# Patient Record
Sex: MALE
[De-identification: names, ages, dates, MRNs, and addresses within clinical notes are randomized per-mention and may not be internally consistent; named-entity substitution may affect disease eponyms.]

---

## 2019-01-12 ENCOUNTER — HOSPITAL ENCOUNTER (EMERGENCY)
Dept: HOSPITAL 88 - ER | Age: 24
Discharge: HOME | End: 2019-01-12
Payer: COMMERCIAL

## 2019-01-12 VITALS — BODY MASS INDEX: 33.86 KG/M2 | WEIGHT: 250 LBS | HEIGHT: 72 IN
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
## 2019-01-12 DIAGNOSIS — S20.219A: Primary | ICD-10-CM

## 2019-01-12 DIAGNOSIS — E66.9: ICD-10-CM

## 2019-01-12 DIAGNOSIS — L30.9: ICD-10-CM

## 2019-01-12 PROCEDURE — 71046 X-RAY EXAM CHEST 2 VIEWS: CPT

## 2019-01-12 PROCEDURE — 99283 EMERGENCY DEPT VISIT LOW MDM: CPT

## 2019-01-12 NOTE — XMS REPORT
Encounter Summary

                             Created on: 2018



Dominic Ch

External Reference #: [id number]

: 1995

Sex: Male



Demographics







                          Address                   [address] [place]  [address]

 

                          Home Phone                [phone]

 

                          Preferred Language        Unknown

 

                          Marital Status            Never 

 

                          Restorationism Affiliation     Unknown

 

                          Race                      Unknown

 

                          Ethnic Group              Unknown





Author







                          Organization              Unknown

 

                          Address                   [address] [place] [address]  [address]



 

                          Phone                     [phone]







Care Team Providers







                    Care Team Member Name    Role                Phone

 

                    Yadira Lopes      3                   [phone]



                                                      



Reason for Visit

                      





                                        Medical Complaint                



                                                                              



Instructions

          





                                        1. Viral gastroenteritis

 

                                         ondansetron HCl 8 mg tablet

 

                                        2. Influenza-like symptoms

 

                                         rapid flu (A+B)

 

                                        3. Headache

 

                                         headache: care instructions

 

                                         EC-Naprosyn 500 mg tablet,delayed release

 

                                        4. Body mass index 30+ - obesity









                                        Discussion Note

 

                                        Pt is in NAD; Verbalizes understanding of all instructions with no questions at 

this time.



                                                                                
       



Plan of Care

          





Patient Instructions





                                        Stay hydrated with pedialyte or gatorade, eat a liquid diet for the first four hours.

 Stay away from fried and spicy foods until symptoms resolve. If you do 
experience improvement in your symptoms within the next four hours, advance 
yourself to a carbohydrate-rich diet such as white rice, white bread, and 
crackers. Within the next four hours thereafter, you can advance to lean meats 
such as chicken, fish, and turkey. Four hours thereafter if symptoms do improve,
then advance to a regular diet. Take over the counter imodium for diarrhea as 
per package insert. Take ondansetron for nausea and vomiting as per package 
insert for headache. Follow up with your PCP within 2-3 should symptoms worsen 
as discussed. In case of an emergency call 911 or go to nearest ER. Recommend 
follow a low sodium/fat/carb diet and exercise 30-45 mins/d 3-4 days a week once
symptoms resolve.











                Reminders                                       Provider

 

                Appointments    None recorded.                   

 

                Lab             Rapid Flu (A+B)    2018      [facility]

 

                Referral        None recorded.                   

 

                Procedures      None recorded.                   

 

                Surgeries       None recorded.                   

 

                Imaging         None recorded.                   



                                                                                
       



Medications

                      





                    Name                      Start Date                                      

 

                                        EC-Naprosyn 500 mg tablet,delayed release

 Take 1 tablet twice a day by oral route as needed for 7 days.    

 

                                        ondansetron HCl 8 mg tablet

 Take 1 tablet every 8 hours by oral route as needed for 2 days.    



                                                                                
                 



Medications Administered

          



  None recorded.                                                                
               



Vitals

          





                Height          Weight          BMI             Blood Pressure 

 

                 6 ft 1 in        250 lbs         33 kg/m2        118/72 mm[Hg]  



                                                                              



Lab Results

                      





                Date                      Name                      Specimen                      Result

                          Interpretation                      Description                

                Value                      Range                      Status                      Address

                                                        

 

             Rapid Flu (A+B)                         Influenza a     negative                [facility]: [facility]

 

                                        Influenza B     negative                [facility]: [facility]





                                                                                
        



Allergies

          





          Code      Code System    Name      Reaction    Severity    Status    Onset

 

             NKDA                                             



                                                                              



Problems

                      





                Name                      Status                      Onset Date                      

Source                                                  

 

                Viral Gastroenteritis    Active          2018      

 

                Influenza-like Symptoms    Active          2018      

 

                Headache        Active          2018      



                                                                                
                            



Procedures

          



None recorded.                                                                  
  



Vaccine List

          





                                        Vaccine Type

 

                                        meningococcal MCV4P

 

                                        2014



                                                                              



Social History

          





                    Smoking Status      Never Smoker         



                                                                              



Past Encounters

                      





                                        2018

Viral Gastroenteritis; Influenza-like Symptoms; Headache; Body Mass Index 30+ - 
Obesity

Elidia Richards, [facility]: [address] [place] [address], Ph. [phone]



                                                                                
        



History of Present Illness

          





                                                   Nausea-Vomiting-Diarrhea / Abdominal Pain

 

                          Reported By:              Patient

 

                          HPI:                      Quality: watery, intermittent. Severity: moderate. Duration: present for <

 1 week, symptoms last for how long?. Onset/Timing: worse with meals, gradual 
onset, 4-10 times a day. Context: no one else with similar symptoms, no recent 
camping, no recent picnic, no possible food sources, no recent travel. 
Alleviating factors: better with fasting. Aggravating factors: eating. 
Associated Symptoms: no abdominal pain, no excess gas, no fever/chills, no rash,
 no joint pain, no weight loss, no vomiting, no heartburn, no blood in stool, no
 mucus in stool, no black or tarry stools, no weakness, no nutrient deficiency, 
no feeling of fullness/mass in throat, no muscle aches, no bitter taste in the 
mouth, no difficulty swallowing (dysphagia), nausea, headache; body aches and 
watery diarrhea









                                                   Headache

 

                          Reported By:              Patient

 

                          HPI:                      Location: frontal. Quality: not the worst headache ever, similar to previous

 headaches. Severity: moderate, pain level 6/10. Duration: intermittent. 
Onset/Timing: gradual, still present. Context: not related to trauma. Modifying 
factors: rest; Has not taken any meds. Associated Symptoms: no fever/chills, no 
muscle aches, no vomiting, no sensitivity to light, tearing/watery eyes, no 
confusion, no slurred speech, no preceeding aura, no double vision, normal 
feeling/sensation, no motor paralysis, no dizziness, no sleep disturbances, no 
nosebleeds, no hoarseness, no sore throat, no hearing loss, headache, nausea; 
body aches and watery diarrhea







Review of Systems:ROS as noted in the HPI                                       
                             



Review of Systems

                      





                                                   Basic

 

                          Reported By:              Patient



                                                                              



Physical Exam

                      





                                                   Adult Basic, 14-21 Yr Male, Adult Male Complete

 

                          Reported By:              Patient

 

                          Constitutional:           General Appearance: obese. Level of Distress: NAD. Ambulation: 

ambulating normally

 

                          Psychiatric:              Mental Status: active and alert. Orientation: to time, to place, to

 person

 

                          Eyes:                     Lids and Conjunctivae: non-injected, no discharge, no pallor. Pupils: PERRLA,

 equal size, round, reactive to light. Corneas: grossly intact. EOM: EOMI, 
normal cover/uncover test. Lens: clear. Vision: peripheral vision grossly intact

 

                          Ear-Nose-Mouth-Throat:    Ears: no lesions on external ear, no outer ear tenderness,

 EACs clear, TMs clear, TM mobility normal. Nose: no lesions on external nose, 
nares patent, no septal deviation, nasal passages clear, no sinus tenderness, no
 nasal discharge. Lips, Teeth, and Gums: no mouth or lip ulcers, no bleeding 
gums, normal dentition. Oropharynx: moist mucous membranes, no erythema, no 
exudates, tonsils not enlarged

 

                          Neck:                     Neck: supple. Lymph Nodes: no cervical LAD

 

                          Lungs:                    Respiratory effort: no dyspnea, no tachypnea, no use of accessory muscles,

 no intercostal retractions. Auscultation: breath sounds normal, good air 
movement

 

                          Cardiovascular:           Heart Auscultation: RRR, no murmurs

 

                          Neurologic:               Gait and Station: normal gait, normal station. Cranial Nerves: grossly

 intact. Sensation: grossly intact. Reflexes: deep tendon reflexes 2+ 
bilaterally throughout

 

                          Abdomen:                  Palpation: non-distended, no guarding, no tenderness. Liver: non-tender,

 no hepatomegaly. Spleen: non-tender, no splenomegaly. Hernia: no palpable 
hernias. Bowel Sounds: high-pitched. Inspection and Palpation: soft, no rebound 
tenderness, no masses, no CVA tenderness

## 2019-01-12 NOTE — XMS REPORT
Continuity of Care Document

                             Created on: 2018



SHUKRI HEARN

External Reference #: [id number]

: 1995

Sex: Male



Demographics







                          Address                   [address] [place]  [address]

 

                          Home Phone                [phone]

 

                          Preferred Language        Unknown

 

                          Marital Status            Unknown

 

                          Mu-ism Affiliation     Unknown

 

                          Race                      Unknown

 

                          Ethnic Group              Unknown





Author







                          Author                    [facility]              Interface

 

                          Address                   Unknown

 

                          Phone                     Unavailable



                                                    



Problems

                    





                    Problem                            Status                            Onset Date     

                          Classification                            Date Reported       

                          Comments                            Source                    

 

                    Viral Gastroenteritis                                                        2018

                            Problem                            2018            

                                                      [facility]                    

 

                    Influenza-like Symptoms                                                        2018

                            Problem                            2018            

                                                      [facility]                    

 

                    Headache                                                        2018          

                          Problem                            2018                      

                                                      [facility]                    

 

                    Viral gastroenteritis                                                        2018

                            Diagnosis                            2018          

                                                      [facility]                    

 

                    Influenza-like symptoms                                                        2018

                            Diagnosis                            2018          

                                                      [facility]                    

 

                          Body mass index 30+ - obesity                                                     

                    2018                            Diagnosis                            2018

                                                        [facility]                   

 

 

                          Viral upper respiratory tract infection                                           

                          2018                            Diagnosis                         

                    2018                                                        [facility]        

            

 

                    Throat symptom                                                        2018    

                          Diagnosis                            2018              

                                                      [facility]                    

 

                    Acute Sinusitis                                                                     

                          Problem                            2018                         

                                                      [facility]                    

 

                    Allergic Rhinitis                                                                   

                          Problem                            2018                       

                                                      [facility]                    

 

                    Gastroenteritis                                                                     

                          Problem                            2018                         

                                                      [facility]                    



                                                                                
                                                                                
                                                                                
     



Medications

                    





                    Medication                            Details                            Route      

                          Status                            Patient Instructions         

                          Ordering Provider                            Order Date           

                                        Source                    

 

                          Naproxen 500 MG Delayed Release Oral Tablet [Naprosyn]                            

EC-Naprosyn 500 mg tablet,delayed release Take 1 tablet twice a day by oral 
route as needed for 7 days.                                                        Active

                                                                                       

                                        [facility]                    

 

                          Ondansetron 8 MG Oral Tablet                            ondansetron HCl 8 mg tablet

 Take 1 tablet every 8 hours by oral route as needed for 2 days.                
                                                Active                                         

                                                                            [facility]      

              

 

                                        Brompheniramine Maleate 0.4 MG/ML / Dextromethorphan Hydrobromide 2 MG/ML / Pseudoephedrine

 Hydrochloride 6 MG/ML Oral Solution [Bromfed DM]                            Bromfed

 DM 2 mg-30 mg-10 mg/5 mL syrup Take 10 mL every 4-6 hours by oral route as 
needed for 6 days.                                                        Active     

                                                                                       

                                        [facility]                    



                                                                                
                                       



Allergies, Adverse Reactions, Alerts

                    





                    Substance                            Category                            Reaction   

                          Severity                            Reaction type           

                          Status                            Date Reported                     

                          Comments                            Source                    



                                                                



Immunizations

                    





                    Immunization                            Date Given                            Site  

                          Status                            Last Updated             

                          Comments                            Source                    

 

                          meningococcal MCV4P                            2014                         

                                                completed                                               

                                                      [facility]                    



                                                                                
       



Results

                    





                    Order Name                            Results                            Value      

                          Reference Range                            Date                

                          Interpretation                            Comments                       

                                        Source                    

 

                                                Influenza A                            negative         

                                                 2018                            

                                                        [facility]                   

 

 

                                                Influenza B                            negative         

                                                 2018                            

                                                        [facility]                   

 

 

                                                RESULT                            negative              

                                                2018                                 

                                                      [facility]                    

 

                                                SWAB LOCATION                            Left and Right 

tonsillar pillars                                                          2018

                                                                                    [facility]

                    

 

                                                Influenza A                            negative         

                                                 2018                            

                                                        [facility]                   

 

 

                                                Influenza B                            negative         

                                                 2018                            

                                                        [facility]                   

 



                                                                                
                                                                                
              



Vital Signs

                    





                    Vital Sign                            Value                            Date         

                          Comments                            Source                    

 

                    Diastolic (mm Hg)                            72                             2018

                                                        [facility]                   

 

 

                    Height                            73                             2018         

                                                      [facility]                    

 

                    Systolic (mm Hg)                            118                             2018

                                                        [facility]                   

 

 

                    Weight                            250                             2018        

                                                      [facility]                    

 

                    Diastolic (mm Hg)                            72                             2018

                                                        [facility]                   

 

 

                    Height                            73                             2018         

                                                      [facility]                    

 

                    Systolic (mm Hg)                            114                             2018

                                                        [facility]                   

 

 

                    Weight                            240                             2018        

                                                      [facility]                    



                                                                                
                                                                                
                                      



Encounters

                    





                    Location                            Location Details                            Encounter

 Type                            Encounter Number                            Reason For

 Visit                            Attending Provider                            ADM Date

                            DC Date                            Status                

                                        Source                    

 

                          [place] - [facility] - [id number]: [address] [place] [address], Ph. [phone]

 [phone]                               [id number]          

                                                      Elidia Richards                          

                    2018                                                                          

                                        [facility]                    

 

                          [place] - [id number]: [address] [place] [address], Ph. [phone]                               [id number]          

                                                      Elidia Richards                          

                    2018                                                                          

                                        [facility]                    



                                                                                
               



Procedures

                    





                    Procedure                            Code                            Date           

                          Perfomer                            Comments                        

                                        Source

## 2019-01-12 NOTE — XMS REPORT
Encounter Summary

                             Created on: 2018



Dominic Ch

External Reference #: [id number]

: 1995

Sex: Male



Demographics







                          Address                   [address] [place]  [address]

 

                          Home Phone                [phone]

 

                          Preferred Language        Unknown

 

                          Marital Status            Never 

 

                          Confucianism Affiliation     Unknown

 

                          Race                      Unknown

 

                          Ethnic Group              Unknown





Author







                          Organization              Unknown

 

                          Address                   [address] [place] [address]  [address]



 

                          Phone                     [phone]







Care Team Providers







                    Care Team Member Name    Role                Phone

 

                    Yadira Lopes      [phone]                   [phone]



                                                      



Reason for Visit

                      





                                        Medical Complaint                



                                                                              



Instructions

          





                                        1. Viral upper respiratory tract infection

 

                                         Bromfed DM 2 mg-30 mg-10 mg/5 mL syrup

 

                                         rapid flu (A+B)

 

                                        2. Throat symptom

 

                                         rapid strep group A, throat









                                        Discussion Note

 

                                        Pt is in NAD; Verbalizes understanding of all instructions with no questions at 

this time.







Patient educational handouts: No information available.                         
                                                    



Plan of Care

          





Patient Instructions





                                        Take Bromfed DM for cough as directed. Take fluticasone as needed for nasal and 

ear congestion. Spray one spray in each nostril twice a day. Take a warm, steamy
shower, blow your nose thereafter, and spray in each nostril. Tilt your head up 
for about 10 seconds and breath through your mouth. Do not sniff or snort the 
medication in or else the medication will go to your throat and not be absorbed 
appropriately. Alternate with Ibuprofen and acetaminophen every 4hrs as needed 
for pain/fever/headache. Proper hydration and rest. Return to work/school if 
free of fever for 24-hrs. Do not share any utensils/cups, no kissing, recommend 
hand washing after coughing/sneezing/blowing nose and cover face when you do so.
Take medications as prescribed. Return to clinic or follow up with your PCP 
within 2-3 days if symptoms worsen as discussed. 

 













                Reminders                                       Provider

 

                Appointments    None recorded.                   

 

                Lab             Rapid Flu (A+B)    2018      [facility]

 

                               Rapid Strep Group a, Throat    2018      [facility]

 

                Referral        None recorded.                   

 

                Procedures      None recorded.                   

 

                Surgeries       None recorded.                   

 

                Imaging         None recorded.                   



                                                                                
                 



Medications

                      





                    Name                      Start Date                                      

 

                                        Bromfed DM 2 mg-30 mg-10 mg/5 mL syrup

 Take 10 mL every 4-6 hours by oral route as needed for 6 days.    



                                                                                
       



Medications Administered

          



  None recorded.                                                                
               



Vitals

          





                Height          Weight          BMI             Blood Pressure 

 

                 6 ft 1 in        240 lbs         31.7 kg/m2        114/72 mm[Hg]  



                                                                              



Lab Results

                      





                Date                      Name                      Specimen                      Result

                          Interpretation                      Description                

                Value                      Range                      Status                      Address

                                                        

 

             Rapid Strep Group a, Throat                         Result     negative                [facility]: 

[facility]

 

                                        Swab Location     Left and Right tonsillar pillars                [facility]:

 [facility]

 

             Rapid Flu (A+B)                         Influenza a     negative                [facility]: [facility]

 

                                        Influenza B     negative                [facility]: [facility]





                                                                                
                  



Allergies

          





          Code      Code System    Name      Reaction    Severity    Status    Onset

 

             NKDA                                             



                                                                              



Problems

                      





                Name                      Status                      Onset Date                      

Source                                                  

 

                Acute Sinusitis    Active                         Encounter

 

                Allergic Rhinitis    Active                         Encounter

 

                Gastroenteritis    Active                         Encounter



                                                                                
                            



Procedures

          



None recorded.                                                                  
  



Vaccine List

          





                                        Vaccine Type

 

                                        meningococcal MCV4P

 

                                        2014



                                                                              



Social History

          





                    Smoking Status      Never Smoker         



                                                                              



Past Encounters

                      





                                        2018

Viral Upper Respiratory Tract Infection; Throat Symptom

Elidia Richards [facility]: [address] [place] [address], Ph. [phone]



                                                                                
        



History of Present Illness

          





                                                   Illness-Fever-Flu

 

                          Reported By:              Patient

 

                          HPI:                      Quality: symptoms worse during the day. Duration: 4 days. Severity: subjective

 temperature. Context: no ill contacts, no tick/insect bites, no recent travel, 
no new medications. Associated Symptoms: no fever/chills, no headache, no muscle
 aches, no rash, no lethargy, cough, nasal passage blockage (stuffiness), nasal 
discharge; scratchy throat. Modifying Factors nothing gives relief







Review of Systems:ROS as noted in the HPI                                       
                             



Review of Systems

                      





                                                   Basic

 

                          Reported By:              Patient



                                                                              



Physical Exam

                      





                                                   Adult Basic, 14-21 Yr Male, Adult Female Complete, Adult Male Complete

 

                          Reported By:              Patient

 

                          Constitutional:           General Appearance: healthy-appearing, well-nourished, well-developed.

 Level of Distress: NAD. Ambulation: ambulating normally

 

                          Psychiatric:              Mental Status: active and alert. Orientation: to time, to place, to

 person

 

                          Ear-Nose-Mouth-Throat:    Ears: no lesions on external ear, no outer ear tenderness,

 EACs clear, TMs clear. Hearing: no hearing loss. Nose: no lesions on external 
nose, nares patent, no septal deviation, nasal passages clear, no sinus 
tenderness, nasal discharge--rhinorrhea, post nasal drip. Lips, Teeth, and Gums:
 no mouth or lip ulcers, no bleeding gums, normal dentition. Oropharynx: moist 
mucous membranes, no erythema, no exudates, tonsils not enlarged

 

                          Neck:                     Lymph Nodes: no cervical LAD

 

                          Lungs:                    Respiratory effort: no dyspnea, no tachypnea, no use of accessory muscles,

 no intercostal retractions. Auscultation: breath sounds normal

 

                          Cardiovascular:           Heart Auscultation: RRR, no murmurs

 

                          Neurologic:               Gait and Station: normal gait, normal station

## 2024-10-06 ENCOUNTER — HOSPITAL ENCOUNTER (INPATIENT)
Dept: HOSPITAL 88 - ER | Age: 29
LOS: 3 days | Discharge: HOME | DRG: 386 | End: 2024-10-09
Attending: INTERNAL MEDICINE | Admitting: INTERNAL MEDICINE
Payer: COMMERCIAL

## 2024-10-06 VITALS
HEART RATE: 80 BPM | RESPIRATION RATE: 18 BRPM | DIASTOLIC BLOOD PRESSURE: 67 MMHG | SYSTOLIC BLOOD PRESSURE: 129 MMHG | TEMPERATURE: 98.1 F | OXYGEN SATURATION: 98 %

## 2024-10-06 VITALS — WEIGHT: 235 LBS | HEIGHT: 72 IN | BODY MASS INDEX: 31.83 KG/M2

## 2024-10-06 VITALS
TEMPERATURE: 97.7 F | DIASTOLIC BLOOD PRESSURE: 79 MMHG | SYSTOLIC BLOOD PRESSURE: 130 MMHG | RESPIRATION RATE: 19 BRPM | HEART RATE: 103 BPM | OXYGEN SATURATION: 98 %

## 2024-10-06 VITALS
DIASTOLIC BLOOD PRESSURE: 71 MMHG | RESPIRATION RATE: 18 BRPM | HEART RATE: 87 BPM | SYSTOLIC BLOOD PRESSURE: 126 MMHG | TEMPERATURE: 98.3 F | OXYGEN SATURATION: 98 %

## 2024-10-06 VITALS
OXYGEN SATURATION: 98 % | SYSTOLIC BLOOD PRESSURE: 134 MMHG | HEART RATE: 86 BPM | DIASTOLIC BLOOD PRESSURE: 74 MMHG | TEMPERATURE: 98.2 F | RESPIRATION RATE: 19 BRPM

## 2024-10-06 VITALS
TEMPERATURE: 97.7 F | OXYGEN SATURATION: 98 % | HEART RATE: 103 BPM | DIASTOLIC BLOOD PRESSURE: 79 MMHG | SYSTOLIC BLOOD PRESSURE: 130 MMHG | RESPIRATION RATE: 19 BRPM

## 2024-10-06 VITALS
HEART RATE: 103 BPM | RESPIRATION RATE: 19 BRPM | OXYGEN SATURATION: 98 % | TEMPERATURE: 97.7 F | SYSTOLIC BLOOD PRESSURE: 130 MMHG | DIASTOLIC BLOOD PRESSURE: 79 MMHG

## 2024-10-06 VITALS
DIASTOLIC BLOOD PRESSURE: 71 MMHG | TEMPERATURE: 98.3 F | RESPIRATION RATE: 18 BRPM | HEART RATE: 87 BPM | SYSTOLIC BLOOD PRESSURE: 126 MMHG | OXYGEN SATURATION: 98 %

## 2024-10-06 VITALS — TEMPERATURE: 98.4 F | HEART RATE: 88 BPM

## 2024-10-06 DIAGNOSIS — K76.0: ICD-10-CM

## 2024-10-06 DIAGNOSIS — R53.81: ICD-10-CM

## 2024-10-06 DIAGNOSIS — R00.0: ICD-10-CM

## 2024-10-06 DIAGNOSIS — R73.9: ICD-10-CM

## 2024-10-06 DIAGNOSIS — K52.9: ICD-10-CM

## 2024-10-06 DIAGNOSIS — N39.0: ICD-10-CM

## 2024-10-06 DIAGNOSIS — E66.9: ICD-10-CM

## 2024-10-06 DIAGNOSIS — R11.2: ICD-10-CM

## 2024-10-06 DIAGNOSIS — E83.41: ICD-10-CM

## 2024-10-06 DIAGNOSIS — R42: ICD-10-CM

## 2024-10-06 DIAGNOSIS — R25.2: ICD-10-CM

## 2024-10-06 DIAGNOSIS — R13.10: ICD-10-CM

## 2024-10-06 DIAGNOSIS — K50.918: Primary | ICD-10-CM

## 2024-10-06 DIAGNOSIS — E86.0: ICD-10-CM

## 2024-10-06 DIAGNOSIS — F41.9: ICD-10-CM

## 2024-10-06 DIAGNOSIS — E87.6: ICD-10-CM

## 2024-10-06 DIAGNOSIS — B96.20: ICD-10-CM

## 2024-10-06 DIAGNOSIS — L30.9: ICD-10-CM

## 2024-10-06 DIAGNOSIS — K62.89: ICD-10-CM

## 2024-10-06 DIAGNOSIS — Z83.3: ICD-10-CM

## 2024-10-06 LAB
ALBUMIN SERPL-MCNC: 4.6 G/DL (ref 3.5–5)
ALBUMIN/GLOB SERPL: 1.2 {RATIO} (ref 0.8–2)
ALP SERPL-CCNC: 77 IU/L (ref 40–150)
ALT SERPL-CCNC: 26 IU/L (ref 0–55)
ANION GAP SERPL CALC-SCNC: 23.3 MMOL/L (ref 8–16)
BACTERIA URNS QL MICRO: (no result) /HPF
BASOPHILS # BLD AUTO: 0 10*3/UL (ref 0–0.1)
BASOPHILS NFR BLD AUTO: 0.3 % (ref 0–1)
BILIRUB SERPL-MCNC: 0.7 MG/DL (ref 0.2–1.2)
BILIRUB UR QL: NEGATIVE
BUN SERPL-MCNC: 17 MG/DL (ref 7–26)
BUN/CREAT SERPL: 17 (ref 6–25)
CALCIUM SERPL-MCNC: 9.8 MG/DL (ref 8.4–10.2)
CHLORIDE SERPL-SCNC: 105 MMOL/L (ref 98–107)
CK SERPL-CCNC: 101 IU/L (ref 30–200)
CLARITY UR: CLEAR
CO2 SERPL-SCNC: 14 MMOL/L (ref 22–29)
COLOR UR: YELLOW
DEPRECATED APTT PLAS QN: 28.2 SECONDS (ref 23.8–35.5)
DEPRECATED INR PLAS: 0.94
DEPRECATED NEUTROPHILS # BLD AUTO: 11.1 10*3/UL (ref 2.1–6.9)
DEPRECATED RBC URNS MANUAL-ACNC: (no result) /HPF (ref 0–5)
EGFRCR SERPLBLD CKD-EPI 2021: 108 ML/MIN (ref 60–?)
EOSINOPHIL # BLD AUTO: 0.1 10*3/UL (ref 0–0.4)
EOSINOPHIL NFR BLD AUTO: 0.3 % (ref 0–6)
EPI CELLS URNS QL MICRO: (no result) /LPF
ERYTHROCYTE [DISTWIDTH] IN CORD BLOOD: 12.4 % (ref 11.7–14.4)
GLOBULIN PLAS-MCNC: 3.7 G/DL (ref 2.3–3.5)
GLUCOSE SERPLBLD-MCNC: 173 MG/DL (ref 74–118)
GLUCOSE UR QL STRIP.AUTO: NEGATIVE
HCT VFR BLD AUTO: 49.6 % (ref 38.2–49.6)
HGB BLD-MCNC: 17 G/DL (ref 14–18)
KETONES UR QL STRIP.AUTO: (no result)
LEUKOCYTE ESTERASE UR QL STRIP.AUTO: NEGATIVE
LIPASE SERPL-CCNC: 56 U/L (ref 8–78)
LYMPHOCYTES # BLD: 3 10*3/UL (ref 1–3.2)
LYMPHOCYTES NFR BLD AUTO: 20.3 % (ref 18–39.1)
MAGNESIUM SERPL-MCNC: 2.2 MG/DL (ref 1.3–2.1)
MCH RBC QN AUTO: 30.5 PG (ref 28–32)
MCHC RBC AUTO-ENTMCNC: 34.3 G/DL (ref 31–35)
MCV RBC AUTO: 89 FL (ref 81–99)
MONOCYTES # BLD AUTO: 0.5 10*3/UL (ref 0.2–0.8)
MONOCYTES NFR BLD AUTO: 3.4 % (ref 4.4–11.3)
NEUTS SEG NFR BLD AUTO: 75.4 % (ref 38.7–80)
NITRITE UR QL STRIP.AUTO: NEGATIVE
PH UR STRIP.AUTO: 5.5 [PH] (ref 5–7)
PLATELET # BLD AUTO: 273 X10E3/UL (ref 140–360)
POTASSIUM SERPL-SCNC: 3.3 MMOL/L (ref 3.5–5.1)
PROT SERPL-MCNC: 8.3 G/DL (ref 6.5–8.1)
PROT UR QL STRIP.AUTO: NEGATIVE
PROTHROMBIN TIME: 13 SECONDS (ref 11.9–14.5)
RBC # BLD AUTO: 5.57 X10E6/UL (ref 4.3–5.7)
SODIUM SERPL-SCNC: 139 MMOL/L (ref 136–145)
SP GR UR STRIP: 1 (ref 1.01–1.02)
TROPONIN I SERPL DL<=0.01 NG/ML-MCNC: < 0.001 NG/ML (ref 0–0.3)
UROBILINOGEN UR STRIP-MCNC: 0.2 MG/DL (ref 0.2–1)
WBC # BLD: 14.73 X10E3/UL (ref 4.8–10.8)
WBC #/AREA URNS HPF: (no result) /HPF (ref 0–5)

## 2024-10-06 PROCEDURE — 80061 LIPID PANEL: CPT

## 2024-10-06 PROCEDURE — 85610 PROTHROMBIN TIME: CPT

## 2024-10-06 PROCEDURE — 87186 SC STD MICRODIL/AGAR DIL: CPT

## 2024-10-06 PROCEDURE — 84100 ASSAY OF PHOSPHORUS: CPT

## 2024-10-06 PROCEDURE — 94799 UNLISTED PULMONARY SVC/PX: CPT

## 2024-10-06 PROCEDURE — 80048 BASIC METABOLIC PNL TOTAL CA: CPT

## 2024-10-06 PROCEDURE — 86140 C-REACTIVE PROTEIN: CPT

## 2024-10-06 PROCEDURE — 87086 URINE CULTURE/COLONY COUNT: CPT

## 2024-10-06 PROCEDURE — 80053 COMPREHEN METABOLIC PANEL: CPT

## 2024-10-06 PROCEDURE — 83690 ASSAY OF LIPASE: CPT

## 2024-10-06 PROCEDURE — 84439 ASSAY OF FREE THYROXINE: CPT

## 2024-10-06 PROCEDURE — 36415 COLL VENOUS BLD VENIPUNCTURE: CPT

## 2024-10-06 PROCEDURE — 84484 ASSAY OF TROPONIN QUANT: CPT

## 2024-10-06 PROCEDURE — 83036 HEMOGLOBIN GLYCOSYLATED A1C: CPT

## 2024-10-06 PROCEDURE — 99284 EMERGENCY DEPT VISIT MOD MDM: CPT

## 2024-10-06 PROCEDURE — 84443 ASSAY THYROID STIM HORMONE: CPT

## 2024-10-06 PROCEDURE — 93005 ELECTROCARDIOGRAM TRACING: CPT

## 2024-10-06 PROCEDURE — 85025 COMPLETE CBC W/AUTO DIFF WBC: CPT

## 2024-10-06 PROCEDURE — 74177 CT ABD & PELVIS W/CONTRAST: CPT

## 2024-10-06 PROCEDURE — 76705 ECHO EXAM OF ABDOMEN: CPT

## 2024-10-06 PROCEDURE — 81001 URINALYSIS AUTO W/SCOPE: CPT

## 2024-10-06 PROCEDURE — 83735 ASSAY OF MAGNESIUM: CPT

## 2024-10-06 PROCEDURE — 85730 THROMBOPLASTIN TIME PARTIAL: CPT

## 2024-10-06 PROCEDURE — 71045 X-RAY EXAM CHEST 1 VIEW: CPT

## 2024-10-06 PROCEDURE — 82550 ASSAY OF CK (CPK): CPT

## 2024-10-06 RX ADMIN — LORAZEPAM ONE MG: 2 INJECTION INTRAMUSCULAR; INTRAVENOUS at 11:30

## 2024-10-06 RX ADMIN — DICYCLOMINE HYDROCHLORIDE ONE MG: 10 INJECTION INTRAMUSCULAR at 11:30

## 2024-10-06 RX ADMIN — SODIUM CHLORIDE STA MLS/HR: 9 INJECTION, SOLUTION INTRAVENOUS at 11:29

## 2024-10-06 RX ADMIN — SODIUM CHLORIDE STA MG: 900 INJECTION INTRAVENOUS at 11:30

## 2024-10-06 RX ADMIN — SODIUM CHLORIDE SCH MLS/HR: 9 INJECTION, SOLUTION INTRAVENOUS at 14:48

## 2024-10-06 RX ADMIN — SODIUM CHLORIDE PRN MG: 900 INJECTION INTRAVENOUS at 18:09

## 2024-10-06 RX ADMIN — METHYLPREDNISOLONE SODIUM SUCCINATE STA MG: 125 INJECTION, POWDER, FOR SOLUTION INTRAMUSCULAR; INTRAVENOUS at 13:19

## 2024-10-06 RX ADMIN — Medication PRN MG: at 18:09

## 2024-10-07 VITALS
DIASTOLIC BLOOD PRESSURE: 93 MMHG | RESPIRATION RATE: 20 BRPM | SYSTOLIC BLOOD PRESSURE: 125 MMHG | HEART RATE: 73 BPM | TEMPERATURE: 98.8 F | OXYGEN SATURATION: 99 %

## 2024-10-07 VITALS
HEART RATE: 65 BPM | DIASTOLIC BLOOD PRESSURE: 56 MMHG | SYSTOLIC BLOOD PRESSURE: 129 MMHG | TEMPERATURE: 98.2 F | RESPIRATION RATE: 18 BRPM | OXYGEN SATURATION: 100 %

## 2024-10-07 VITALS
DIASTOLIC BLOOD PRESSURE: 67 MMHG | TEMPERATURE: 98.1 F | HEART RATE: 80 BPM | OXYGEN SATURATION: 98 % | RESPIRATION RATE: 18 BRPM | SYSTOLIC BLOOD PRESSURE: 129 MMHG

## 2024-10-07 VITALS
TEMPERATURE: 98.6 F | DIASTOLIC BLOOD PRESSURE: 67 MMHG | HEART RATE: 67 BPM | RESPIRATION RATE: 18 BRPM | OXYGEN SATURATION: 100 % | SYSTOLIC BLOOD PRESSURE: 114 MMHG

## 2024-10-07 VITALS — OXYGEN SATURATION: 99 % | HEART RATE: 68 BPM | RESPIRATION RATE: 18 BRPM

## 2024-10-07 VITALS
DIASTOLIC BLOOD PRESSURE: 69 MMHG | OXYGEN SATURATION: 100 % | SYSTOLIC BLOOD PRESSURE: 129 MMHG | RESPIRATION RATE: 20 BRPM | HEART RATE: 51 BPM | TEMPERATURE: 97.4 F

## 2024-10-07 LAB
ALBUMIN SERPL-MCNC: 3.6 G/DL (ref 3.5–5)
ALBUMIN/GLOB SERPL: 1.1 {RATIO} (ref 0.8–2)
ALP SERPL-CCNC: 51 IU/L (ref 40–150)
ALT SERPL-CCNC: 19 IU/L (ref 0–55)
ANION GAP SERPL CALC-SCNC: 12.1 MMOL/L (ref 8–16)
BASOPHILS # BLD AUTO: 0 10*3/UL (ref 0–0.1)
BASOPHILS NFR BLD AUTO: 0.1 % (ref 0–1)
BILIRUB SERPL-MCNC: 0.6 MG/DL (ref 0.2–1.2)
BUN SERPL-MCNC: 10 MG/DL (ref 7–26)
BUN/CREAT SERPL: 12 (ref 6–25)
CALCIUM SERPL-MCNC: 8.6 MG/DL (ref 8.4–10.2)
CHLORIDE SERPL-SCNC: 108 MMOL/L (ref 98–107)
CO2 SERPL-SCNC: 22 MMOL/L (ref 22–29)
DEPRECATED NEUTROPHILS # BLD AUTO: 8.9 10*3/UL (ref 2.1–6.9)
EGFRCR SERPLBLD CKD-EPI 2021: 121 ML/MIN (ref 60–?)
EOSINOPHIL # BLD AUTO: 0 10*3/UL (ref 0–0.4)
EOSINOPHIL NFR BLD AUTO: 0 % (ref 0–6)
ERYTHROCYTE [DISTWIDTH] IN CORD BLOOD: 12.8 % (ref 11.7–14.4)
GLOBULIN PLAS-MCNC: 3.2 G/DL (ref 2.3–3.5)
GLUCOSE SERPLBLD-MCNC: 136 MG/DL (ref 74–118)
HCT VFR BLD AUTO: 41.4 % (ref 38.2–49.6)
HGB BLD-MCNC: 13.8 G/DL (ref 14–18)
LYMPHOCYTES # BLD: 1.6 10*3/UL (ref 1–3.2)
LYMPHOCYTES NFR BLD AUTO: 14.1 % (ref 18–39.1)
MCH RBC QN AUTO: 30.7 PG (ref 28–32)
MCHC RBC AUTO-ENTMCNC: 33.3 G/DL (ref 31–35)
MCV RBC AUTO: 92 FL (ref 81–99)
MONOCYTES # BLD AUTO: 0.8 10*3/UL (ref 0.2–0.8)
MONOCYTES NFR BLD AUTO: 6.6 % (ref 4.4–11.3)
NEUTS SEG NFR BLD AUTO: 78.8 % (ref 38.7–80)
PLATELET # BLD AUTO: 184 X10E3/UL (ref 140–360)
POTASSIUM SERPL-SCNC: 4.1 MMOL/L (ref 3.5–5.1)
PROT SERPL-MCNC: 6.8 G/DL (ref 6.5–8.1)
RBC # BLD AUTO: 4.5 X10E6/UL (ref 4.3–5.7)
SODIUM SERPL-SCNC: 138 MMOL/L (ref 136–145)
WBC # BLD: 11.35 X10E3/UL (ref 4.8–10.8)

## 2024-10-07 RX ADMIN — SERTRALINE HYDROCHLORIDE SCH MG: 50 TABLET, FILM COATED ORAL at 17:55

## 2024-10-07 RX ADMIN — DOCUSATE SODIUM SCH MG: 100 TABLET, FILM COATED ORAL at 15:36

## 2024-10-07 RX ADMIN — MECLIZINE HYDROCHLORIDE SCH MG: 12.5 TABLET ORAL at 20:38

## 2024-10-07 RX ADMIN — PROMETHAZINE HYDROCHLORIDE ONE MG: 25 INJECTION, SOLUTION INTRAMUSCULAR; INTRAVENOUS at 07:32

## 2024-10-08 VITALS — OXYGEN SATURATION: 99 % | HEART RATE: 56 BPM | RESPIRATION RATE: 18 BRPM

## 2024-10-08 VITALS
HEART RATE: 56 BPM | OXYGEN SATURATION: 99 % | TEMPERATURE: 98.2 F | RESPIRATION RATE: 17 BRPM | SYSTOLIC BLOOD PRESSURE: 113 MMHG | DIASTOLIC BLOOD PRESSURE: 59 MMHG

## 2024-10-08 VITALS
HEART RATE: 58 BPM | RESPIRATION RATE: 17 BRPM | SYSTOLIC BLOOD PRESSURE: 112 MMHG | TEMPERATURE: 98.1 F | DIASTOLIC BLOOD PRESSURE: 62 MMHG | OXYGEN SATURATION: 99 %

## 2024-10-08 VITALS
SYSTOLIC BLOOD PRESSURE: 122 MMHG | OXYGEN SATURATION: 100 % | RESPIRATION RATE: 18 BRPM | HEART RATE: 57 BPM | TEMPERATURE: 98.1 F | DIASTOLIC BLOOD PRESSURE: 61 MMHG

## 2024-10-08 VITALS — RESPIRATION RATE: 16 BRPM | OXYGEN SATURATION: 98 % | HEART RATE: 73 BPM

## 2024-10-08 VITALS
SYSTOLIC BLOOD PRESSURE: 111 MMHG | RESPIRATION RATE: 18 BRPM | OXYGEN SATURATION: 99 % | TEMPERATURE: 96.7 F | DIASTOLIC BLOOD PRESSURE: 72 MMHG | HEART RATE: 54 BPM

## 2024-10-08 VITALS
HEART RATE: 63 BPM | SYSTOLIC BLOOD PRESSURE: 120 MMHG | OXYGEN SATURATION: 97 % | DIASTOLIC BLOOD PRESSURE: 56 MMHG | RESPIRATION RATE: 17 BRPM | TEMPERATURE: 98.4 F

## 2024-10-08 VITALS
HEART RATE: 64 BPM | DIASTOLIC BLOOD PRESSURE: 55 MMHG | TEMPERATURE: 97.7 F | OXYGEN SATURATION: 99 % | RESPIRATION RATE: 18 BRPM | SYSTOLIC BLOOD PRESSURE: 113 MMHG

## 2024-10-08 LAB
ALBUMIN SERPL-MCNC: 3.2 G/DL (ref 3.5–5)
ALBUMIN/GLOB SERPL: 1.2 {RATIO} (ref 0.8–2)
ALP SERPL-CCNC: 50 IU/L (ref 40–150)
ALT SERPL-CCNC: 23 IU/L (ref 0–55)
ANION GAP SERPL CALC-SCNC: 10.5 MMOL/L (ref 8–16)
BASOPHILS # BLD AUTO: 0 10*3/UL (ref 0–0.1)
BASOPHILS NFR BLD AUTO: 0.4 % (ref 0–1)
BILIRUB SERPL-MCNC: 0.6 MG/DL (ref 0.2–1.2)
BUN SERPL-MCNC: 8 MG/DL (ref 7–26)
BUN/CREAT SERPL: 9 (ref 6–25)
CALCIUM SERPL-MCNC: 8.2 MG/DL (ref 8.4–10.2)
CHLORIDE SERPL-SCNC: 109 MMOL/L (ref 98–107)
CHOLEST SERPL-MCNC: 120 MD/DL (ref 0–199)
CHOLEST/HDLC SERPL: 3.2 {RATIO} (ref 3.9–4.7)
CO2 SERPL-SCNC: 22 MMOL/L (ref 22–29)
DEPRECATED NEUTROPHILS # BLD AUTO: 4 10*3/UL (ref 2.1–6.9)
DEPRECATED PHOSPHATE SERPL-MCNC: 2.5 MG/DL (ref 2.3–4.7)
EGFRCR SERPLBLD CKD-EPI 2021: 121 ML/MIN (ref 60–?)
EOSINOPHIL # BLD AUTO: 0.2 10*3/UL (ref 0–0.4)
EOSINOPHIL NFR BLD AUTO: 2.4 % (ref 0–6)
ERYTHROCYTE [DISTWIDTH] IN CORD BLOOD: 13.1 % (ref 11.7–14.4)
GLOBULIN PLAS-MCNC: 2.7 G/DL (ref 2.3–3.5)
GLUCOSE SERPLBLD-MCNC: 96 MG/DL (ref 74–118)
HCT VFR BLD AUTO: 37.3 % (ref 38.2–49.6)
HDLC SERPL-MSCNC: 38 MG/DL (ref 40–60)
HGB BLD-MCNC: 12.6 G/DL (ref 14–18)
LDLC SERPL CALC-MCNC: 61 MG/DL (ref 60–130)
LYMPHOCYTES # BLD: 2.4 10*3/UL (ref 1–3.2)
LYMPHOCYTES NFR BLD AUTO: 33.1 % (ref 18–39.1)
MAGNESIUM SERPL-MCNC: 1.9 MG/DL (ref 1.3–2.1)
MCH RBC QN AUTO: 31.2 PG (ref 28–32)
MCHC RBC AUTO-ENTMCNC: 33.8 G/DL (ref 31–35)
MCV RBC AUTO: 92.3 FL (ref 81–99)
MONOCYTES # BLD AUTO: 0.5 10*3/UL (ref 0.2–0.8)
MONOCYTES NFR BLD AUTO: 7.3 % (ref 4.4–11.3)
NEUTS SEG NFR BLD AUTO: 56.7 % (ref 38.7–80)
PLATELET # BLD AUTO: 154 X10E3/UL (ref 140–360)
POTASSIUM SERPL-SCNC: 3.5 MMOL/L (ref 3.5–5.1)
PROT SERPL-MCNC: 5.9 G/DL (ref 6.5–8.1)
RBC # BLD AUTO: 4.04 X10E6/UL (ref 4.3–5.7)
SODIUM SERPL-SCNC: 138 MMOL/L (ref 136–145)
TRIGL SERPL-MCNC: 103 MG/DL (ref 0–149)
TSH SERPL DL<=0.005 MIU/L-ACNC: 2.27 UIU/ML (ref 0.35–4.94)
WBC # BLD: 7.12 X10E3/UL (ref 4.8–10.8)

## 2024-10-08 RX ADMIN — METOCLOPRAMIDE SCH MG: 5 INJECTION, SOLUTION INTRAMUSCULAR; INTRAVENOUS at 05:44

## 2024-10-08 RX ADMIN — LEVOFLOXACIN SCH MLS/HR: 5 INJECTION, SOLUTION INTRAVENOUS at 16:16

## 2024-10-08 RX ADMIN — LEVOFLOXACIN ONE MLS/HR: 5 INJECTION, SOLUTION INTRAVENOUS at 01:15

## 2024-10-08 RX ADMIN — METOCLOPRAMIDE STA MG: 5 INJECTION, SOLUTION INTRAMUSCULAR; INTRAVENOUS at 02:39

## 2024-10-09 VITALS — RESPIRATION RATE: 20 BRPM | HEART RATE: 63 BPM | OXYGEN SATURATION: 99 %

## 2024-10-09 VITALS
TEMPERATURE: 97.7 F | RESPIRATION RATE: 17 BRPM | OXYGEN SATURATION: 100 % | SYSTOLIC BLOOD PRESSURE: 111 MMHG | HEART RATE: 55 BPM | DIASTOLIC BLOOD PRESSURE: 58 MMHG

## 2024-10-09 VITALS
TEMPERATURE: 97.6 F | HEART RATE: 69 BPM | RESPIRATION RATE: 20 BRPM | DIASTOLIC BLOOD PRESSURE: 59 MMHG | SYSTOLIC BLOOD PRESSURE: 117 MMHG | OXYGEN SATURATION: 100 %

## 2024-10-09 VITALS
OXYGEN SATURATION: 100 % | SYSTOLIC BLOOD PRESSURE: 117 MMHG | HEART RATE: 56 BPM | DIASTOLIC BLOOD PRESSURE: 66 MMHG | TEMPERATURE: 97.9 F | RESPIRATION RATE: 17 BRPM

## 2024-10-09 LAB
ANION GAP SERPL CALC-SCNC: 12.5 MMOL/L (ref 8–16)
BUN SERPL-MCNC: 8 MG/DL (ref 7–26)
BUN/CREAT SERPL: 10 (ref 6–25)
CALCIUM SERPL-MCNC: 8.6 MG/DL (ref 8.4–10.2)
CHLORIDE SERPL-SCNC: 107 MMOL/L (ref 98–107)
CO2 SERPL-SCNC: 22 MMOL/L (ref 22–29)
EGFRCR SERPLBLD CKD-EPI 2021: 122 ML/MIN (ref 60–?)
GLUCOSE SERPLBLD-MCNC: 90 MG/DL (ref 74–118)
POTASSIUM SERPL-SCNC: 3.5 MMOL/L (ref 3.5–5.1)
SODIUM SERPL-SCNC: 138 MMOL/L (ref 136–145)

## 2024-10-09 RX ADMIN — Medication ONE MEQ: at 10:28

## 2024-10-21 ENCOUNTER — HOSPITAL ENCOUNTER (OUTPATIENT)
Dept: HOSPITAL 88 - OR | Age: 29
Discharge: HOME | End: 2024-10-21
Attending: INTERNAL MEDICINE
Payer: COMMERCIAL

## 2024-10-21 VITALS — TEMPERATURE: 97.6 F

## 2024-10-21 VITALS
RESPIRATION RATE: 16 BRPM | HEART RATE: 60 BPM | OXYGEN SATURATION: 97 % | DIASTOLIC BLOOD PRESSURE: 71 MMHG | SYSTOLIC BLOOD PRESSURE: 109 MMHG

## 2024-10-21 DIAGNOSIS — K64.8: ICD-10-CM

## 2024-10-21 DIAGNOSIS — F32.A: ICD-10-CM

## 2024-10-21 DIAGNOSIS — Z79.899: ICD-10-CM

## 2024-10-21 DIAGNOSIS — K20.90: ICD-10-CM

## 2024-10-21 DIAGNOSIS — K62.89: ICD-10-CM

## 2024-10-21 DIAGNOSIS — K29.70: Primary | ICD-10-CM

## 2024-10-21 DIAGNOSIS — K21.9: ICD-10-CM

## 2024-10-21 DIAGNOSIS — K52.9: ICD-10-CM

## 2024-10-21 DIAGNOSIS — K63.89: ICD-10-CM

## 2024-10-21 DIAGNOSIS — F41.0: ICD-10-CM

## 2024-10-21 DIAGNOSIS — K31.7: ICD-10-CM

## 2024-10-21 DIAGNOSIS — E66.01: ICD-10-CM

## 2024-10-21 DIAGNOSIS — K31.89: ICD-10-CM

## 2024-10-21 DIAGNOSIS — Z71.3: ICD-10-CM

## 2024-10-21 DIAGNOSIS — K29.80: ICD-10-CM

## 2024-10-21 LAB — LACTOFERRIN STL QL: NEGATIVE

## 2024-10-21 PROCEDURE — 87177 OVA AND PARASITES SMEARS: CPT

## 2024-10-21 PROCEDURE — 83630 LACTOFERRIN FECAL (QUAL): CPT

## 2024-10-21 PROCEDURE — 82784 ASSAY IGA/IGD/IGG/IGM EACH: CPT

## 2024-10-21 PROCEDURE — 45380 COLONOSCOPY AND BIOPSY: CPT

## 2024-10-21 PROCEDURE — 83993 ASSAY FOR CALPROTECTIN FECAL: CPT

## 2024-10-21 PROCEDURE — 83516 IMMUNOASSAY NONANTIBODY: CPT

## 2024-10-21 PROCEDURE — 86140 C-REACTIVE PROTEIN: CPT

## 2024-10-21 PROCEDURE — 87449 NOS EACH ORGANISM AG IA: CPT

## 2024-10-21 PROCEDURE — 87045 FECES CULTURE AEROBIC BACT: CPT

## 2024-10-21 PROCEDURE — 43239 EGD BIOPSY SINGLE/MULTIPLE: CPT

## 2024-10-21 PROCEDURE — 87328 CRYPTOSPORIDIUM AG IA: CPT

## 2024-10-21 PROCEDURE — 86256 FLUORESCENT ANTIBODY TITER: CPT

## 2024-10-21 PROCEDURE — 87324 CLOSTRIDIUM AG IA: CPT

## 2024-10-21 RX ADMIN — SODIUM CHLORIDE, POTASSIUM CHLORIDE, SODIUM LACTATE AND CALCIUM CHLORIDE ONE ML/HR: 600; 310; 30; 20 INJECTION, SOLUTION INTRAVENOUS at 13:23

## 2024-10-22 LAB — CRP SERPL-MCNC: 2 MG/L (ref 0–10)

## 2024-10-24 LAB
IGA SERPL-MCNC: 276 MG/DL (ref 90–386)
TTG IGA SER QL: <2 U/ML (ref 0–3)